# Patient Record
Sex: FEMALE | Race: WHITE | NOT HISPANIC OR LATINO | Employment: PART TIME | URBAN - METROPOLITAN AREA
[De-identification: names, ages, dates, MRNs, and addresses within clinical notes are randomized per-mention and may not be internally consistent; named-entity substitution may affect disease eponyms.]

---
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
## 2022-03-01 ENCOUNTER — OFFICE VISIT (OUTPATIENT)
Dept: FAMILY MEDICINE CLINIC | Facility: CLINIC | Age: 22
End: 2022-03-01

## 2022-03-01 VITALS
BODY MASS INDEX: 29.25 KG/M2 | WEIGHT: 149 LBS | RESPIRATION RATE: 14 BRPM | SYSTOLIC BLOOD PRESSURE: 108 MMHG | HEIGHT: 60 IN | HEART RATE: 106 BPM | DIASTOLIC BLOOD PRESSURE: 88 MMHG | OXYGEN SATURATION: 98 %

## 2022-03-01 DIAGNOSIS — R21 RASH AND NONSPECIFIC SKIN ERUPTION: Primary | ICD-10-CM

## 2022-03-01 DIAGNOSIS — L59.0 ERYTHEMA AB IGNE: ICD-10-CM

## 2022-03-01 PROCEDURE — 99213 OFFICE O/P EST LOW 20 MIN: CPT | Performed by: NURSE PRACTITIONER

## 2022-03-01 NOTE — PROGRESS NOTES
Subjective   Elvia Fernández is a 22 y.o. female.   abnormal mapping on her lower back    History of Present Illness   Rash that started on her back a couple of months ago.She has not tried any new shampoos or creams or any new clothing. It is not painful or pruritic.    The following portions of the patient's history were reviewed and updated as appropriate: allergies, current medications, past family history, past medical history, past social history, past surgical history and problem list.    Review of Systems   Constitutional: Negative for activity change, appetite change and fever.   HENT: Negative for congestion.    Respiratory: Negative for cough.    Skin: Positive for rash.       Objective   Physical Exam  Vitals and nursing note reviewed.   Constitutional:       Appearance: She is well-developed.   HENT:      Head: Normocephalic and atraumatic.   Eyes:      Pupils: Pupils are equal, round, and reactive to light.   Pulmonary:      Effort: Pulmonary effort is normal.   Musculoskeletal:         General: Normal range of motion.   Skin:     General: Skin is warm and dry.      Findings: Rash present.      Comments: Erythematous reticulated hyperpigmentation to back.   Neurological:      Mental Status: She is alert and oriented to person, place, and time.           Assessment/Plan   Problem List Items Addressed This Visit     None      Visit Diagnoses     Rash and nonspecific skin eruption    -  Primary    Relevant Orders    Ambulatory Referral to Dermatology    Erythema ab igne            Stop the use of the heating pad       Return if symptoms worsen or fail to improve.

## 2022-05-05 ENCOUNTER — TELEPHONE (OUTPATIENT)
Dept: URGENT CARE | Facility: CLINIC | Age: 22
End: 2022-05-05

## 2022-05-05 NOTE — TELEPHONE ENCOUNTER
Spoke to patient about her positive Covid results.  Patient stated she went ahead and quarantined but just wanted to make sure.  Apologized for getting test results back late and patient understood.

## 2024-11-05 ENCOUNTER — OFFICE VISIT (OUTPATIENT)
Dept: INTERNAL MEDICINE | Facility: CLINIC | Age: 24
End: 2024-11-05
Payer: COMMERCIAL

## 2024-11-05 VITALS
HEART RATE: 94 BPM | OXYGEN SATURATION: 99 % | TEMPERATURE: 97.1 F | DIASTOLIC BLOOD PRESSURE: 82 MMHG | SYSTOLIC BLOOD PRESSURE: 116 MMHG | WEIGHT: 149 LBS | BODY MASS INDEX: 29.1 KG/M2

## 2024-11-05 DIAGNOSIS — M79.641 RIGHT HAND PAIN: ICD-10-CM

## 2024-11-05 DIAGNOSIS — Z76.89 ENCOUNTER TO ESTABLISH CARE: Primary | ICD-10-CM

## 2024-11-05 DIAGNOSIS — Z09 HOSPITAL DISCHARGE FOLLOW-UP: ICD-10-CM

## 2024-11-05 DIAGNOSIS — M54.2 CERVICAL PAIN (NECK): ICD-10-CM

## 2024-11-05 DIAGNOSIS — S13.100D: ICD-10-CM

## 2024-11-05 DIAGNOSIS — M54.6 ACUTE BILATERAL THORACIC BACK PAIN: ICD-10-CM

## 2024-11-05 PROCEDURE — 99215 OFFICE O/P EST HI 40 MIN: CPT

## 2024-11-05 PROCEDURE — 99417 PROLNG OP E/M EACH 15 MIN: CPT

## 2024-11-05 RX ORDER — IBUPROFEN 600 MG/1
TABLET ORAL
COMMUNITY
Start: 2024-10-29

## 2024-11-05 RX ORDER — METHOCARBAMOL 750 MG/1
1500 TABLET, FILM COATED ORAL 3 TIMES DAILY
COMMUNITY
Start: 2024-10-29 | End: 2024-11-12

## 2024-11-05 RX ORDER — ERYTHROMYCIN 5 MG/G
OINTMENT OPHTHALMIC
COMMUNITY
Start: 2024-10-29

## 2024-11-05 RX ORDER — MULTIPLE VITAMINS W/ MINERALS TAB 9MG-400MCG
1 TAB ORAL DAILY
COMMUNITY

## 2024-11-05 NOTE — PATIENT INSTRUCTIONS
Cervical Subluxation    Cervical subluxation is a separation of the neck bones (cervical vertebrae). It occurs due to an injury of the ligaments that hold the vertebrae together. The cervical vertebrae are made up of seven bones. These vertebrae support the head and protect the spinal cord as it passes through the neck.  What are the causes?  This condition may be caused by injury or trauma that happens with sudden impact. It commonly occurs when the neck is extended too far. This can happen from:  A car accident. This is the most common cause of this condition.  A sports injury.  A fall.  A violent physical attack.  Other causes include:  Having arthritis.  Having bad posture over a long period of time (chronic).  What are the signs or symptoms?  Symptoms can vary depending on the angle of separation to the cervical vertebrae. Symptoms include:  Pain or tenderness when the neck is touched.  Pain when moving the neck or trouble moving the neck.  Headaches.  A stiff neck.  Neck muscle spasms.  Dizziness.  A change in the position of the neck.  With a spinal cord or nerve root injury in the neck, symptoms may also include:  Weakness and numbness in the arm or leg.  A weak hand grasp. This may cause you to drop things.  Poor handwriting.  Falling or having trouble balancing.  Loss of bowel or bladder control (incontinence).  How is this diagnosed?  This condition is diagnosed with your medical history, a physical exam, and imaging tests. Tests may include:  X-rays.  A CT scan.  An MRI.  How is this treated?  Treatment for this condition depends on the severity of the condition. Treatment may involve:  Resting.  Wearing a neck brace or collar to support the neck. This limits how much you can move your neck.  Medicine to reduce pain and swelling.  Closed reduction. This uses physical manipulation to realign the cervical vertebrae without surgery.  Skeletal traction. This uses weights, pulleys, and ropes to exert a  pulling force to help realign the cervical vertebrae. It may be used prior to surgery.  Surgery to put the cervical vertebrae back in place. Surgery is needed if:  The cervical vertebrae do not stay in place and they move easily (are unstable).  There is a lot of nerve damage in the cervical vertebrae area.  There is a spinal cord injury.  There is a pocket of blood over the spinal cord.  There is a cervical disc pushing on the spinal cord.  Follow these instructions at home:  If you have a neck brace or collar:  Wear the neck brace or collar as told by your health care provider. Remove it only as told by your health care provider.  Check the skin around the brace or collar every day. Tell your health care provider about any concerns.  Keep the neck brace or collar clean and dry.  Ask your health care provider when it is safe to drive if you have a brace or collar on your neck.  If the brace or collar is not waterproof:  Do not let it get wet.  Cover it with a watertight covering when you take a bath or shower.  Activity    Rest and limit your neck movement as told by your health care provider.  Limit your physical activity as told by your health care provider.  You may have to avoid lifting. Ask your health care provider how much you can safely lift.  If physical therapy was prescribed, do any exercises as told by your health care provider or physical therapist.  General instructions  Take over-the-counter and prescription medicines only as told by your health care provider.  Ask your health care provider if the medicine prescribed to you:  Requires you to avoid driving or using machinery.  Can cause constipation. You may need to take these actions to prevent or treat constipation:  Drink enough fluid to keep your urine pale yellow.  Take over-the-counter or prescription medicines.  Eat foods that are high in fiber, such as beans, whole grains, and fresh fruits and vegetables.  Limit foods that are high in fat and  processed sugars, such as fried or sweet foods.  Do not take baths, swim, or use a hot tub until your health care provider approves. Ask your health care provider if you may take showers. You may only be allowed to take sponge baths.  Keep all follow-up visits. This is important to get follow-up tests and to see how you are healing. Your health care provider will also determine when your brace can be removed.  Contact a health care provider if:  Your pain continues and does not get better after you take pain medicine.  You have weakness, numbness, or tingling in your arms or legs that is getting worse.  You have a fever.  Get help right away if:  You have sudden, severe neck pain.  You have sudden, severe weakness, numbness, or tingling in your arms or legs.  You have difficulty walking, or you fall for no reason.  You have a severe headache that does not go away.  You cannot control your bowels or bladder.  You have difficulty breathing.  These symptoms may be an emergency. Get help right away. Call 911.  Do not wait to see if the symptoms will go away.  Do not drive yourself to the hospital.  Summary  Cervical subluxation is a separation of the neck bones (cervical vertebrae). It occurs due to an injury of the ligaments that hold the vertebrae together.  Treatment for this condition depends on the severity of the condition.  There are many treatment options, such as resting, wearing a neck brace or collar, taking medicine for pain, and having physical manipulation to realign the cervical vertebrae.  If you have a neck brace or collar, wear it as told by your health care provider.  Get help right away if you have sudden, severe neck pain, or you have sudden, severe weakness, numbness, or tingling in your arms or legs.  This information is not intended to replace advice given to you by your health care provider. Make sure you discuss any questions you have with your health care provider.  Document Revised:  03/14/2023 Document Reviewed: 03/14/2023  [employer] Patient Education © 2024 [employer]     Motor Vehicle Collision Injury, Adult  After a car accident (motor vehicle collision), it is common to have injuries to your head, face, arms, and body. These injuries may include cuts, burns, and bruises. The injuries may also include sore muscles, muscles strains, headaches, and broken bones.  You may feel stiff and sore for the first several hours. You may feel worse after waking up the first morning after the accident. These injuries often feel worse for the first 24-48 hours. After that, you will usually begin to get better with each day. How quickly you get better often depends on:  How bad the accident was.  How many injuries you have.  Where your injuries are.  What types of injuries you have.  If you were wearing a seat belt.  If your airbag was used.  A head injury may result in a concussion. This is a type of brain injury that can have serious effects. If you have a concussion, you should rest as told by your doctor. You must be very careful to avoid having a second concussion.  Follow these instructions at home:  Medicines  Take over-the-counter and prescription medicines only as told by your doctor.  If you were prescribed antibiotics, take or apply them as told by your doctor. Do not stop using them even if you start to feel better.  Wound care    Follow instructions from your doctor about how to take care of your wound. Make sure you:  Clean your wound. To do this:  Wash it with mild soap and water.  Rinse it with water to get all the soap off.  Pat it dry with a clean towel. Do not rub it.  Put an ointment or cream on the wound, if you were told to do so.  Know when and how to change or remove your bandage (dressing).  Always wash your hands with soap and water for at least 20 seconds before and after you change your bandage. If you cannot use soap and water, use hand .  Leave stitches or skin glue  in place for at least 2 weeks.  Leave tape strips alone unless you are told to take them off. You may trim the edges of the tape strips if they curl up.  Avoid getting sun on your wound.  Do not disturb the wound. This means:  Do not scratch or pick at the wound.  Do not break any blisters you may have.  Do not peel any skin.  Check your wound every day for signs of infection. Check for:  More redness, swelling, or pain.  More fluid or blood.  Warmth.  Pus or a bad smell.     Managing pain, stiffness, and swelling    If told, put ice on the injured areas.  Put ice in a plastic bag.  Place a towel between your skin and the bag.  Leave the ice on for 20 minutes, 2-3 times a day.  If your skin turns bright red, take off the ice right away to prevent skin damage. The risk of skin damage is higher if you cannot feel pain, heat, or cold.  Raise (elevate) the wound above the level of your heart while you are sitting or lying down.  Sleep with your head raised if the wound is on your face. You may do this by putting an extra pillow under your head.  Activity  Rest. Rest helps your body to heal. Make sure you:  Get plenty of sleep at night. Avoid staying up late.  Go to bed at the same time on weekends and weekdays.  You may have to avoid lifting. Ask your doctor how much you can safely lift.  Ask your doctor when you can drive, ride a bicycle, or use machinery. Do not do these activities if you are dizzy.  If you are told to wear a brace on an injured arm, leg, or other part of your body, follow instructions from your doctor about activities. Your doctor may give you instructions about driving, bathing, exercising, or working.  General instructions  If you have a splint, brace, or sling, follow your doctor's instructions on how to use the device.  Drink enough fluid to keep your pee (urine) pale yellow.  Do not drink alcohol.  Eat healthy foods.  Contact a doctor if:  You have very bad neck pain, especially pain in the  middle of the back of your neck.  You have loss of feeling (numbness), tingling, or weakness in your arms or legs.  You have a change in your ability to control your pee or poop (stool).  You have swelling in any area of your body, especially your legs.  You have signs of infection in a wound.  You have a fever.  You have blood in your pee, poop, or vomit.  You have any of the following symptoms for more than 2 weeks after your car accident:  Long-term (chronic) headaches.  Dizziness or balance problems.  Feeling like you may vomit.  Problems with how you see (vision).  More sensitivity to noise or light.  Sleep problems.  Feeling tired all the time.  Mental health changes such as:  Depression or mood swings.  Feeling worried or nervous (anxiety).  Getting upset or bothered easily.  Memory problems.  Trouble concentrating or paying attention.  Get help right away if:  You have shortness of breath.  You have light-headedness or you faint.  You have chest pain.  You have these eye or vision changes:  Sudden vision loss or double vision.  Your eye suddenly turns red.  The black center of your eye (pupil) is an odd shape or size.  These symptoms may be an emergency. Get help right away. Call 911.  Do not wait to see if the symptoms will go away.  Do not drive yourself to the hospital.  This information is not intended to replace advice given to you by your health care provider. Make sure you discuss any questions you have with your health care provider.  Document Revised: 06/12/2023 Document Reviewed: 06/12/2023  [employer] Patient Education © 2024 [employer]

## 2024-11-05 NOTE — LETTER
November 5, 2024     Patient: Elvia Fernández   YOB: 2000   Date of Visit: 11/5/2024       To Whom It May Concern:    It is my medical opinion that Elvia Fernández may return to work 11/10/24         Sincerely,        WYATT Koo    CC: No Recipients
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
## 2024-11-07 ENCOUNTER — PATIENT ROUNDING (BHMG ONLY) (OUTPATIENT)
Dept: INTERNAL MEDICINE | Facility: CLINIC | Age: 24
End: 2024-11-07
Payer: COMMERCIAL

## 2024-12-02 ENCOUNTER — TELEPHONE (OUTPATIENT)
Dept: ORTHOPEDICS | Facility: OTHER | Age: 24
End: 2024-12-02
Payer: COMMERCIAL

## 2024-12-02 NOTE — TELEPHONE ENCOUNTER
PATIENT IS SCHEDULED FOR A MRI ON 12/12 AND WANTED TO KNOW IF WE STILL WANT HER TO COME IN TOMORROW BEFORE THE MRI.  PLEASE CALL HER BACK.

## 2024-12-05 ENCOUNTER — PATIENT ROUNDING (BHMG ONLY) (OUTPATIENT)
Dept: URGENT CARE | Facility: CLINIC | Age: 24
End: 2024-12-05
Payer: COMMERCIAL

## 2024-12-05 NOTE — ED NOTES
Thank you for letting us care for you during your recent visit at [facility]. We would love to hear about your experience with us.         We’re always looking for ways to make our patients’ experiences even better. Do you have any recommendations on ways we may improve?         I appreciate you taking the time to respond. Please be on the lookout for a survey about your recent visit from [facility] via text or email. We would greatly appreciate if you could fill that out and turn it back in. We want your voice to be heard and we value your feedback.         Thank you for choosing [facility] for your healthcare needs.

## 2024-12-12 ENCOUNTER — HOSPITAL ENCOUNTER (OUTPATIENT)
Dept: MRI IMAGING | Facility: HOSPITAL | Age: 24
Discharge: HOME | End: 2024-12-12
Payer: COMMERCIAL

## 2024-12-12 DIAGNOSIS — M54.2 CERVICAL PAIN (NECK): ICD-10-CM

## 2024-12-12 DIAGNOSIS — M79.641 RIGHT HAND PAIN: ICD-10-CM

## 2024-12-12 DIAGNOSIS — S13.100D: ICD-10-CM

## 2024-12-12 DIAGNOSIS — M54.6 ACUTE BILATERAL THORACIC BACK PAIN: ICD-10-CM

## 2024-12-12 PROCEDURE — 72141 MRI NECK SPINE W/O DYE: CPT

## 2024-12-12 PROCEDURE — 73218 MRI UPPER EXTREMITY W/O DYE: CPT

## 2024-12-12 PROCEDURE — 72146 MRI CHEST SPINE W/O DYE: CPT

## 2024-12-13 DIAGNOSIS — S12.690A COMPRESSION FRACTURE OF C7 VERTEBRA, INITIAL ENCOUNTER: Primary | ICD-10-CM

## 2024-12-13 DIAGNOSIS — S22.030A COMPRESSION FRACTURE OF T3 VERTEBRA, INITIAL ENCOUNTER: ICD-10-CM

## 2024-12-13 DIAGNOSIS — M50.30 BULGING OF CERVICAL INTERVERTEBRAL DISC: ICD-10-CM

## 2024-12-13 DIAGNOSIS — S22.010A COMPRESSION FRACTURE OF T1 VERTEBRA, INITIAL ENCOUNTER: ICD-10-CM

## 2024-12-13 DIAGNOSIS — S22.020A COMPRESSION FRACTURE OF T2 VERTEBRA, INITIAL ENCOUNTER: ICD-10-CM
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
## 2024-12-16 ENCOUNTER — TREATMENT (OUTPATIENT)
Age: 24
End: 2024-12-16
Payer: COMMERCIAL

## 2024-12-16 DIAGNOSIS — M54.2 PAIN, NECK: Primary | ICD-10-CM

## 2024-12-16 DIAGNOSIS — M54.9 MID BACK PAIN: ICD-10-CM

## 2024-12-16 PROCEDURE — 97161 PT EVAL LOW COMPLEX 20 MIN: CPT | Performed by: PHYSICAL THERAPIST

## 2024-12-16 PROCEDURE — 97140 MANUAL THERAPY 1/> REGIONS: CPT | Performed by: PHYSICAL THERAPIST

## 2024-12-16 PROCEDURE — 97110 THERAPEUTIC EXERCISES: CPT | Performed by: PHYSICAL THERAPIST
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
## 2024-12-30 ENCOUNTER — OFFICE VISIT (OUTPATIENT)
Dept: INTERNAL MEDICINE | Facility: CLINIC | Age: 24
End: 2024-12-30
Payer: COMMERCIAL

## 2024-12-30 VITALS
TEMPERATURE: 97.1 F | WEIGHT: 161 LBS | HEART RATE: 107 BPM | DIASTOLIC BLOOD PRESSURE: 84 MMHG | SYSTOLIC BLOOD PRESSURE: 116 MMHG | OXYGEN SATURATION: 98 % | BODY MASS INDEX: 31.61 KG/M2 | HEIGHT: 60 IN

## 2024-12-30 DIAGNOSIS — M62.830 MUSCLE SPASM OF BACK: Chronic | ICD-10-CM

## 2024-12-30 DIAGNOSIS — M54.50 LUMBAR PAIN: Chronic | ICD-10-CM

## 2024-12-30 DIAGNOSIS — M54.2 CERVICAL PAIN: Primary | Chronic | ICD-10-CM

## 2024-12-30 PROCEDURE — 99214 OFFICE O/P EST MOD 30 MIN: CPT

## 2024-12-30 RX ORDER — METHOCARBAMOL 500 MG/1
500 TABLET, FILM COATED ORAL 2 TIMES DAILY PRN
Qty: 60 TABLET | Refills: 0 | Status: SHIPPED | OUTPATIENT
Start: 2024-12-30 | End: 2025-01-08

## 2024-12-30 NOTE — LETTER
January 6, 2025     Patient: Elvia Fernández   YOB: 2000   Date of Visit: 12/30/2024       To Whom It May Concern:    It is my medical opinion that Elvia Fernández may return to work today.         Sincerely,        WYATT Koo    CC: No Recipients
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
## 2024-12-30 NOTE — PATIENT INSTRUCTIONS
Cervical Strain and Sprain Rehab  Ask your health care provider which exercises are safe for you. Do exercises exactly as told by your health care provider and adjust them as directed. It is normal to feel mild stretching, pulling, tightness, or discomfort as you do these exercises. Stop right away if you feel sudden pain or your pain gets worse. Do not begin these exercises until told by your health care provider.  Stretching and range-of-motion exercises  Cervical side bending    Using good posture, sit on a stable chair or stand up.  Without moving your shoulders, slowly tilt your left / right ear to your shoulder until you feel a stretch in the neck muscles on the opposite side. You should be looking straight ahead.  Hold for __________ seconds.  Repeat with the other side of your neck.  Repeat __________ times. Complete this exercise __________ times a day.  Cervical rotation    Using good posture, sit on a stable chair or stand up.  Slowly turn your head to the side as if you are looking over your left / right shoulder.  Keep your eyes level with the ground.  Stop when you feel a stretch along the side and the back of your neck.  Hold for __________ seconds.  Repeat this by turning to your other side.  Repeat __________ times. Complete this exercise __________ times a day.  Thoracic extension and pectoral stretch    Roll a towel or a small blanket so it is about 4 inches (10 cm) in diameter.  Lie down on your back on a firm surface.  Put the towel in the middle of your back across your spine. It should not be under your shoulder blades.  Put your hands behind your head and let your elbows fall out to your sides.  Hold for __________ seconds.  Repeat __________ times. Complete this exercise __________ times a day.  Strengthening exercises  Upper cervical flexion    Lie on your back with a thin pillow behind your head or a small, rolled-up towel under your neck.  Gently tuck your chin toward your chest and nod  your head down to look toward your feet. Do not lift your head off the pillow.  Hold for __________ seconds.  Release the tension slowly. Relax your neck muscles completely before you repeat this exercise.  Repeat __________ times. Complete this exercise __________ times a day.  Cervical extension    Stand about 6 inches (15 cm) away from a wall, with your back facing the wall.  Place a soft object, about 6-8 inches (15-20 cm) in diameter, between the back of your head and the wall. A soft object could be a small pillow, a ball, or a folded towel.  Gently tilt your head back and press into the soft object. Keep your jaw and forehead relaxed.  Hold for __________ seconds.  Release the tension slowly. Relax your neck muscles completely before you repeat this exercise.  Repeat __________ times. Complete this exercise __________ times a day.  Posture and body mechanics  Body mechanics refer to the movements and positions of your body while you do your daily activities. Posture is part of body mechanics. Good posture and healthy body mechanics can help to relieve stress in your body's tissues and joints. Good posture means that your spine is in its natural S-curve position (your spine is neutral), your shoulders are pulled back slightly, and your head is not tipped forward.  The following are general guidelines for using improved posture and body mechanics in your everyday activities.  Sitting    When sitting, keep your spine neutral and keep your feet flat on the floor. Use a footrest, if needed, and keep your thighs parallel to the floor. Avoid rounding your shoulders. Avoid tilting your head forward.  When working at a desk or a computer, keep your desk at a height where your hands are slightly lower than your elbows. Slide your chair under your desk so you are close enough to maintain good posture.  When working at a computer, place your monitor at a height where you are looking straight ahead and you do not have to  tilt your head forward or downward to look at the screen.  Standing    When standing, keep your spine neutral and keep your feet about hip-width apart. Keep a slight bend in your knees. Your ears, shoulders, and hips should line up.  When you do a task in which you  one place for a long time, place one foot up on a stable object that is 2-4 inches (5-10 cm) high, such as a footstool. This helps keep your spine neutral.  Resting    When lying down and resting, avoid positions that are most painful for you. Try to support your neck in a neutral position. You can use a contour pillow or a small rolled-up towel. Your pillow should support your neck but not push on it.  This information is not intended to replace advice given to you by your health care provider. Make sure you discuss any questions you have with your health care provider.  Document Revised: 07/10/2023 Document Reviewed: 07/10/2023  [employer] Patient Education © 2024 [employer]        Cervical Strain and Sprain Rehab  Ask your health care provider which exercises are safe for you. Do exercises exactly as told by your health care provider and adjust them as directed. It is normal to feel mild stretching, pulling, tightness, or discomfort as you do these exercises. Stop right away if you feel sudden pain or your pain gets worse. Do not begin these exercises until told by your health care provider.  Stretching and range-of-motion exercises  Cervical side bending    Using good posture, sit on a stable chair or stand up.  Without moving your shoulders, slowly tilt your left / right ear to your shoulder until you feel a stretch in the neck muscles on the opposite side. You should be looking straight ahead.  Hold for __________ seconds.  Repeat with the other side of your neck.  Repeat __________ times. Complete this exercise __________ times a day.  Cervical rotation    Using good posture, sit on a stable chair or stand up.  Slowly turn your head to  the side as if you are looking over your left / right shoulder.  Keep your eyes level with the ground.  Stop when you feel a stretch along the side and the back of your neck.  Hold for __________ seconds.  Repeat this by turning to your other side.  Repeat __________ times. Complete this exercise __________ times a day.  Thoracic extension and pectoral stretch    Roll a towel or a small blanket so it is about 4 inches (10 cm) in diameter.  Lie down on your back on a firm surface.  Put the towel in the middle of your back across your spine. It should not be under your shoulder blades.  Put your hands behind your head and let your elbows fall out to your sides.  Hold for __________ seconds.  Repeat __________ times. Complete this exercise __________ times a day.  Strengthening exercises  Upper cervical flexion    Lie on your back with a thin pillow behind your head or a small, rolled-up towel under your neck.  Gently tuck your chin toward your chest and nod your head down to look toward your feet. Do not lift your head off the pillow.  Hold for __________ seconds.  Release the tension slowly. Relax your neck muscles completely before you repeat this exercise.  Repeat __________ times. Complete this exercise __________ times a day.  Cervical extension    Stand about 6 inches (15 cm) away from a wall, with your back facing the wall.  Place a soft object, about 6-8 inches (15-20 cm) in diameter, between the back of your head and the wall. A soft object could be a small pillow, a ball, or a folded towel.  Gently tilt your head back and press into the soft object. Keep your jaw and forehead relaxed.  Hold for __________ seconds.  Release the tension slowly. Relax your neck muscles completely before you repeat this exercise.  Repeat __________ times. Complete this exercise __________ times a day.  Posture and body mechanics  Body mechanics refer to the movements and positions of your body while you do your daily activities.  Posture is part of body mechanics. Good posture and healthy body mechanics can help to relieve stress in your body's tissues and joints. Good posture means that your spine is in its natural S-curve position (your spine is neutral), your shoulders are pulled back slightly, and your head is not tipped forward.  The following are general guidelines for using improved posture and body mechanics in your everyday activities.  Sitting    When sitting, keep your spine neutral and keep your feet flat on the floor. Use a footrest, if needed, and keep your thighs parallel to the floor. Avoid rounding your shoulders. Avoid tilting your head forward.  When working at a desk or a computer, keep your desk at a height where your hands are slightly lower than your elbows. Slide your chair under your desk so you are close enough to maintain good posture.  When working at a computer, place your monitor at a height where you are looking straight ahead and you do not have to tilt your head forward or downward to look at the screen.  Standing    When standing, keep your spine neutral and keep your feet about hip-width apart. Keep a slight bend in your knees. Your ears, shoulders, and hips should line up.  When you do a task in which you  one place for a long time, place one foot up on a stable object that is 2-4 inches (5-10 cm) high, such as a footstool. This helps keep your spine neutral.  Resting    When lying down and resting, avoid positions that are most painful for you. Try to support your neck in a neutral position. You can use a contour pillow or a small rolled-up towel. Your pillow should support your neck but not push on it.  This information is not intended to replace advice given to you by your health care provider. Make sure you discuss any questions you have with your health care provider.  Document Revised: 07/10/2023 Document Reviewed: 07/10/2023  [employer] Patient Education © 2024 [employer]

## 2025-01-08 ENCOUNTER — PATIENT MESSAGE (OUTPATIENT)
Dept: INTERNAL MEDICINE | Facility: CLINIC | Age: 25
End: 2025-01-08
Payer: COMMERCIAL

## 2025-01-08 PROBLEM — M54.50 LUMBAR PAIN: Chronic | Status: ACTIVE | Noted: 2025-01-08

## 2025-01-08 PROBLEM — M54.2 CERVICAL PAIN: Chronic | Status: ACTIVE | Noted: 2025-01-08

## 2025-01-08 RX ORDER — METHOCARBAMOL 500 MG/1
500 TABLET, FILM COATED ORAL 2 TIMES DAILY PRN
Qty: 60 TABLET | Refills: 0 | Status: SHIPPED | OUTPATIENT
Start: 2025-01-08

## 2025-01-08 NOTE — TELEPHONE ENCOUNTER
Called pt [phone] and gave contact number for spinal office - pt also asked for phone number for [facility] who she is sched with for 1/13 number provided.

## 2025-01-17 ENCOUNTER — OFFICE VISIT (OUTPATIENT)
Dept: ORTHOPEDIC SURGERY | Facility: CLINIC | Age: 25
End: 2025-01-17
Payer: COMMERCIAL

## 2025-01-17 VITALS — WEIGHT: 156.6 LBS | TEMPERATURE: 98.2 F | BODY MASS INDEX: 30.74 KG/M2 | HEIGHT: 60 IN

## 2025-01-17 DIAGNOSIS — S13.4XXA WHIPLASH INJURIES, INITIAL ENCOUNTER: ICD-10-CM

## 2025-01-17 DIAGNOSIS — M54.2 CERVICAL PAIN: Primary | Chronic | ICD-10-CM

## 2025-01-17 PROCEDURE — 99214 OFFICE O/P EST MOD 30 MIN: CPT | Performed by: NURSE PRACTITIONER
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
## 2025-01-19 PROBLEM — M77.8 TENDINITIS OF FINGER OF RIGHT HAND: Status: ACTIVE | Noted: 2025-01-13

## 2025-01-19 PROBLEM — Z00.00 ANNUAL PHYSICAL EXAM: Status: ACTIVE | Noted: 2025-01-19

## 2025-01-19 PROBLEM — M79.644 PAIN IN FINGER OF RIGHT HAND: Status: ACTIVE | Noted: 2025-01-13

## 2025-01-20 ENCOUNTER — OFFICE VISIT (OUTPATIENT)
Dept: INTERNAL MEDICINE | Facility: CLINIC | Age: 25
End: 2025-01-20
Payer: COMMERCIAL

## 2025-01-20 VITALS
HEIGHT: 60 IN | HEART RATE: 91 BPM | SYSTOLIC BLOOD PRESSURE: 102 MMHG | DIASTOLIC BLOOD PRESSURE: 72 MMHG | OXYGEN SATURATION: 100 % | BODY MASS INDEX: 30.82 KG/M2 | WEIGHT: 157 LBS | TEMPERATURE: 96.9 F

## 2025-01-20 DIAGNOSIS — Z11.59 NEED FOR HEPATITIS C SCREENING TEST: ICD-10-CM

## 2025-01-20 DIAGNOSIS — Z13.6 SCREENING FOR CARDIOVASCULAR CONDITION: ICD-10-CM

## 2025-01-20 DIAGNOSIS — M54.2 CERVICAL PAIN: Chronic | ICD-10-CM

## 2025-01-20 DIAGNOSIS — Z23 NEED FOR VACCINATION: ICD-10-CM

## 2025-01-20 DIAGNOSIS — Z00.00 ROUTINE HEALTH MAINTENANCE: ICD-10-CM

## 2025-01-20 DIAGNOSIS — Z00.00 ANNUAL PHYSICAL EXAM: Primary | ICD-10-CM

## 2025-01-20 DIAGNOSIS — E66.811 CLASS 1 OBESITY DUE TO EXCESS CALORIES WITHOUT SERIOUS COMORBIDITY WITH BODY MASS INDEX (BMI) OF 30.0 TO 30.9 IN ADULT: Chronic | ICD-10-CM

## 2025-01-20 DIAGNOSIS — M54.50 LUMBAR PAIN: Chronic | ICD-10-CM

## 2025-01-20 DIAGNOSIS — E66.09 CLASS 1 OBESITY DUE TO EXCESS CALORIES WITHOUT SERIOUS COMORBIDITY WITH BODY MASS INDEX (BMI) OF 30.0 TO 30.9 IN ADULT: Chronic | ICD-10-CM

## 2025-01-20 PROCEDURE — 99395 PREV VISIT EST AGE 18-39: CPT

## 2025-01-20 PROCEDURE — 99214 OFFICE O/P EST MOD 30 MIN: CPT

## 2025-01-20 PROCEDURE — 91320 SARSCV2 VAC 30MCG TRS-SUC IM: CPT

## 2025-01-20 PROCEDURE — 90480 ADMN SARSCOV2 VAC 1/ONLY CMP: CPT

## 2025-01-20 NOTE — ASSESSMENT & PLAN NOTE
Patient's (Body mass index is 30.66 kg/m².) indicates that they are obese (BMI >30) with health conditions that include none . Weight is newly identified. BMI  is above average; BMI management plan is completed. We discussed low calorie, low carb based diet program, portion control, increasing exercise, management of depression/anxiety/stress to control compensatory eating, and Information on healthy weight added to patient's after visit summary.     Per CDC recommendations each week the average adult needs 150 minutes (30 min workouts 3-5 times a week) of moderate - intensity physical activity and 2 days of muscle strengthening.

## 2025-01-20 NOTE — ASSESSMENT & PLAN NOTE
Please continue with PT for the next 12 weeks.  If you need refills please request from our office if muscle spasms continue.

## 2025-01-20 NOTE — ASSESSMENT & PLAN NOTE
Current recommendations according to the current Physical Activity Guidelines for Americans: adults need 150-300 minutes of physical exercise weekly. It is also recommended to perform two sessions of full body strength training exercise weekly which includes all major muscle groups including legs, hips, back, abdomen, chest, shoulders, and arms.   Current CDC recommendations for diet include following a diet that emphasizes fruits, vegetables, whole grains that is low in saturated fats and low in sugar intake.   Adults should consume at least 3 cup equivalents of fruit and vegetables daily. It is also beneficial to get 25 grams of fiber daily unless told otherwise by your healthcare provider    Anticipatory guidance given regarding health prevention/wellness, diet/exercise, tobacco/alcohol/drug education, exercise and wellbeing, covid 19 guidance, vaccination recommendations, and sexual health/STD education.   Recommended bi-yearly dental exams and regular vision examinations.

## 2025-01-20 NOTE — PROGRESS NOTES
Chief Complaint  Annual Exam     Subjective:      History of Present Illness {CC  Problem List  Visit  Diagnosis   Encounters  Notes  Medications  Labs  Result Review Imaging  Media :23}     Elvia Fernández presents to [facility] for:    Patient or patient representative verbalized consent for the use of Ambient Listening during the visit with  WYATT Koo for chart documentation. 1/20/2025  08:26 EST     History of Present Illness      The patient presents for a physical exam.    Following visit with Santa Moreno with [facility] She is scheduled for an additional 12 weeks of physical therapy, with arrangements to be made this week. She has consulted with a hand surgeon who determined that surgical intervention is not necessary at this time. Instead, she will undergo 6 more weeks of physical therapy. If this approach proves ineffective, a cortisone injection will be considered, followed by surgery as a last resort. The surgeon anticipates that her condition should improve with these measures. She has recently resumed work without any issues. She reports no lumps or bumps that require examination today. She does not require any medication refills at this time and has approximately half of her Robaxin supply remaining. She inquires if she can request a refill of Robaxin if she needs it after completing the current supply.    Her last Pap smear was conducted on 07/05/2024, with all previous results being normal. She declines STD screening today, as she typically undergoes these tests with her gynecologist, Dr. Melendrez. She is sexually active and has never had a mammogram. She reports no changes in bowel habits over the past year, such as chronic constipation or diarrhea. She underwent a colonoscopy at the age of 4 due to rectal bleeding but has not required one since. She maintains good oral health, visiting the dentist twice a year, with her last visit being 3  months ago. She does not regularly have her eyes checked but uses reading glasses. She walks approximately 3 miles daily at work but does not engage in other physical activities. She is single and works full-time at [employer]. She experiences low stress levels but high anxiety.    SOCIAL HISTORY  She is single and works full-time at [employer]. She reports no history of smoking.    FAMILY HISTORY  Her maternal grandmother had diabetes and schizophrenia. Her maternal side also has a history of breast cancer, brain cancer, and liver cancer. Her paternal side has a history of throat cancer.    MEDICATIONS  Bhargavi Gan is here for coordination of medical care, to discuss health maintenance, disease prevention as well as to followup on medical problems.     History reviewed. No pertinent past medical history.  Family History   Problem Relation Age of Onset    Schizophrenia Maternal Grandmother     Diabetes Maternal Grandfather     Cancer Other         several members    Drug abuse Other         several members     Social History     Tobacco Use   Smoking Status Never   Smokeless Tobacco Never     Social History     Substance and Sexual Activity   Alcohol Use Yes    Comment: rarely       Patient Care Team:  Cynthia Silva APRN as PCP - General (Family Medicine)     Social:  Marital status: single. She feels safe at home  Employment: full time. [employer]  Patient rates her stress level as: low. Anxiety is high    Dental visits: 2 times a year.last visit three months ago   Dental health: good. Brushes teeth 2 times a day, flosses occasionally.   Vision correction: not needed. Wears reading glasses  Hearing: normal.  Activity level is minimal.   Exercises 3 per week. Walking three miles a day at work     Weight trend is     Health and Weight:   Weight trend is   Wt Readings from Last 4 Encounters:   01/20/25 71.2 kg (157 lb)   01/17/25 71 kg (156 lb 9.6 oz)   12/30/24 73 kg (161 lb)   12/02/24 70.3 kg (155 lb)         Mental Health  Check:   Depression screen: PHQ-2 Total Score:  0    Health Maintenance Female:  GYN: Dr Melendrez  Last gynecology appointment: 07/05/25  Patient's last mammogram was never  Last Completed Mammogram       This patient has no relevant Health Maintenance data.           Advised routine self-breast exams monthly.  Sexually active: yes  Pap smears have been: Benign   Dexa scan (65 for women and 70 for men with fractures)- N/a  STI Screen:   [] HIV     [] Syphilis   [] Chlamydia/ Gonorrhea   [x] Declines STD screen    Colon cancer screen:     She has no change in bowel habits.   Patient's last colonoscopy was @ 4 years of age.   Last Completed Colonoscopy       This patient has no relevant Health Maintenance data.           She was advised to repeat in Patient declines due to age.    Vaccines:   Vaccines Due:   [] Prevnar 20     [] Flu  [] Shingrix (shingles: series of 2)   [] TDap  [x] COVID (booster)     [] Declines vaccines     Advised regular sunscreen.      Her cardiovascular risks are:   The ASCVD Risk score (Summer MARTINEZ, et al., 2019) failed to calculate for the following reasons:    The 2019 ASCVD risk score is only valid for ages 40 to 79    Risk Evaluation:  1. Diabetes risk factors: obesity, FH of diabetes, and sedentary life style.   2. Cancer risk factors: FH of breast cancer. Mothers side breast cancer   3. Hepatitis  C screen: [x] Due  [] Completed :     Lung Cancer Screen:   [x] Nonsmoker  [] History of smoking  []     [] No Known risk factors    [] Hypertension   [] Hyperlipidemia  [] Diabetes    [x] Obesity  [x] Family history   [] Current or hx tobacco use  [x] Sedentary lifestyle   [] Post-menopausal     Skin Cancer:   Regular Sunsceen: Advised  Routine self assessment of your skin, report any changes.   Always where sunglass when outside with UV protection        I have reviewed patient's medical history, any new submitted information provided by patient or medical assistant and updated medical record.       Objective:      Physical Exam  Vitals reviewed.   Constitutional:       General: She is awake. She is not in acute distress.     Appearance: Normal appearance. She is well-groomed. She is obese. She is not ill-appearing, toxic-appearing or diaphoretic.   HENT:      Head: Normocephalic.      Right Ear: Hearing normal.      Left Ear: Hearing normal.      Nose: Nose normal.      Mouth/Throat:      Lips: Pink.      Mouth: Mucous membranes are moist.   Eyes:      General: Lids are normal. Vision grossly intact.         Right eye: No foreign body, discharge or hordeolum.         Left eye: No foreign body, discharge or hordeolum.      Extraocular Movements: Extraocular movements intact.      Conjunctiva/sclera: Conjunctivae normal.      Pupils: Pupils are equal, round, and reactive to light. Pupils are equal.   Neck:      Thyroid: No thyroid mass, thyromegaly or thyroid tenderness.      Vascular: No carotid bruit or JVD.      Trachea: Trachea and phonation normal.   Cardiovascular:      Rate and Rhythm: Normal rate and regular rhythm.      Pulses: Normal pulses.      Heart sounds: Normal heart sounds, S1 normal and S2 normal. No murmur heard.     No friction rub. No gallop.   Pulmonary:      Effort: Pulmonary effort is normal. No respiratory distress.      Breath sounds: Normal breath sounds. No stridor, decreased air movement or transmitted upper airway sounds. No decreased breath sounds, wheezing, rhonchi or rales.   Abdominal:      General: Abdomen is flat. Bowel sounds are normal. There is no distension.      Palpations: Abdomen is soft. There is no mass.      Tenderness: There is no abdominal tenderness. There is no right CVA tenderness, left CVA tenderness, guarding or rebound.      Hernia: No hernia is present. There is no hernia in the umbilical area.   Musculoskeletal:         General: No swelling, tenderness, deformity or signs of injury. Normal range of motion.      Cervical back: Neck supple. No swelling,  edema, deformity, erythema, signs of trauma, lacerations, rigidity, spasms, torticollis, tenderness, bony tenderness or crepitus. No pain with movement. Decreased range of motion.      Thoracic back: Spasms present. No swelling, edema, deformity, signs of trauma, lacerations, tenderness or bony tenderness. Decreased range of motion. No scoliosis.      Lumbar back: No swelling, edema, deformity, signs of trauma, lacerations, spasms, tenderness or bony tenderness. Decreased range of motion. No scoliosis.      Right lower leg: No edema.      Left lower leg: No edema.   Lymphadenopathy:      Head:      Right side of head: No submental, submandibular, tonsillar or preauricular adenopathy.      Left side of head: No submental, submandibular, tonsillar or preauricular adenopathy.      Cervical: No cervical adenopathy.   Skin:     General: Skin is warm and dry.      Capillary Refill: Capillary refill takes less than 2 seconds.      Coloration: Skin is not jaundiced or pale.      Findings: No bruising, erythema, lesion or rash.   Neurological:      General: No focal deficit present.      Mental Status: She is alert and oriented to person, place, and time. Mental status is at baseline.      Motor: No weakness.      Gait: Gait normal.   Psychiatric:         Attention and Perception: Attention and perception normal.         Mood and Affect: Mood and affect normal.         Speech: Speech normal.         Behavior: Behavior normal. Behavior is cooperative.         Thought Content: Thought content normal.         Cognition and Memory: Cognition and memory normal.         Judgment: Judgment normal.        Result Review  Data Reviewed:{ Labs  Result Review  Imaging  Med Tab  Media :23}     Results         The following data was reviewed by: WYATT Koo on 01/20/2025      Progress Notes by Santa Moreno APRN (01/17/2025 2:20 PM)      Vital Signs:   /72 (BP Location: Left arm, Patient Position: Sitting, Cuff  "Size: Adult)   Pulse 91   Temp 96.9 °F (36.1 °C) (Temporal)   Ht 152.4 cm (60\")   Wt 71.2 kg (157 lb)   SpO2 100%   BMI 30.66 kg/m²                   Requested Prescriptions      No prescriptions requested or ordered in this encounter       Routine medications provided by this office will also be refilled via pharmacy request.       Current Outpatient Medications:      MG tablet, , Disp: , Rfl:     Levonorgestrel (Rhonda) 13.5 MG intrauterine device IUD, To be inserted one time by prescriber. Route intrauterine., Disp: , Rfl:     methocarbamol (ROBAXIN) 500 MG tablet, Take 1 tablet by mouth 2 (Two) Times a Day As Needed for Muscle Spasms., Disp: 60 tablet, Rfl: 0    multivitamin with minerals tablet tablet, Take 1 tablet by mouth Daily., Disp: , Rfl:      Assessment and Plan:      Assessment and Plan {CC Problem List  Visit Diagnosis  ROS  Review (Popup)  Health Maintenance  Quality  BestPractice  Medications  SmartSets  SnapShot Encounters  Media :23}     Problem List Items Addressed This Visit       Lumbar pain (Chronic)    Current Assessment & Plan     Please continue with PT for the next 12 weeks.  If you need refills please request from our office if muscle spasms continue.          Cervical pain (Chronic)    Current Assessment & Plan     Please continue with PT for the next 12 weeks         Annual physical exam - Primary    Current Assessment & Plan     Current recommendations according to the current Physical Activity Guidelines for Americans: adults need 150-300 minutes of physical exercise weekly. It is also recommended to perform two sessions of full body strength training exercise weekly which includes all major muscle groups including legs, hips, back, abdomen, chest, shoulders, and arms.   Current CDC recommendations for diet include following a diet that emphasizes fruits, vegetables, whole grains that is low in saturated fats and low in sugar intake.   Adults should consume at " least 3 cup equivalents of fruit and vegetables daily. It is also beneficial to get 25 grams of fiber daily unless told otherwise by your healthcare provider    Anticipatory guidance given regarding health prevention/wellness, diet/exercise, tobacco/alcohol/drug education, exercise and wellbeing, covid 19 guidance, vaccination recommendations, and sexual health/STD education.   Recommended bi-yearly dental exams and regular vision examinations.           Class 1 obesity due to excess calories without serious comorbidity with body mass index (BMI) of 30.0 to 30.9 in adult (Chronic)    Current Assessment & Plan     Patient's (Body mass index is 30.66 kg/m².) indicates that they are obese (BMI >30) with health conditions that include none . Weight is newly identified. BMI  is above average; BMI management plan is completed. We discussed low calorie, low carb based diet program, portion control, increasing exercise, management of depression/anxiety/stress to control compensatory eating, and Information on healthy weight added to patient's after visit summary.     Per CDC recommendations each week the average adult needs 150 minutes (30 min workouts 3-5 times a week) of moderate - intensity physical activity and 2 days of muscle strengthening.             Other Visit Diagnoses       Routine health maintenance        Relevant Orders    TSH Rfx On Abnormal To Free T4    Comprehensive Metabolic Panel    CBC & Differential    Microalbumin / Creatinine Urine Ratio - Urine, Clean Catch    Need for vaccination        Need for hepatitis C screening test        Relevant Orders    Hepatitis C antibody    Screening for cardiovascular condition        Relevant Orders    Lipid Panel                 1. Health maintenance.  Her last Pap smear was conducted on 07/05/2024, with all previous results being normal. She is sexually active and has never had a mammogram. She reports no changes in bowel habits over the past year, such as  chronic constipation or diarrhea. She underwent a colonoscopy at the age of 4 due to rectal bleeding but has not required one since. She maintains good oral health, visiting the dentist twice a year, with her last visit being 3 months ago. She does not regularly have her eyes checked but uses reading glasses. She walks approximately 3 miles daily at work but does not engage in other physical activities. She is single and works full-time at [employer]. She experiences low stress levels but high anxiety. A comprehensive set of laboratory tests will be conducted today, including CBC, CMP, lipid panel, thyroid function, and urinalysis. She is scheduled to receive her COVID-19 vaccine during this visit. She has been advised to seek an ophthalmological evaluation should she experience symptoms such as headaches or blurred vision. If the lab results return within normal limits, she will be contacted to discuss the findings and schedule a follow-up appointment for repeat testing.    2. Back pain.  She is currently undergoing physical therapy and has 12 more weeks scheduled. She has been using Robaxin for muscle spasms and has about half of her prescription left. She is advised to continue physical therapy as planned. If she experiences muscle spasms post-therapy, she can request a refill of Robaxin.    3. Hand pain.  She saw a hand surgeon and was advised that surgery is not needed at this time. She has 6 more weeks of physical therapy scheduled for her hand. If physical therapy does not alleviate the symptoms, a cortisone shot will be considered, followed by surgery if necessary.    Follow-up  The patient will follow up in 1 year, or sooner if necessary.    PROCEDURE  The patient underwent a colonoscopy at the age of 4 due to rectal bleeding.       Patient Counseling:    Diet:    Eat vegetables, fruits, whole grain, low-fat dairy, poultry, fish, beans, nontropical vegetable oils, and nuts, but avoid red meat (i.e.,  Mediterranean-style diet, DASH [Dietary Approaches to Stop Hypertension] diet).  Limit sugary drinks and sweets.  Limit saturated and trans fat to 5% to 6% of calories.  Limit sodium intake to 2,400 mg daily (about one teaspoon table salt [kosher/sea salt have less sodium per teaspoon]).    Exercise:   Engage in moderate-to-vigorous aerobic activity for at least 40 minutes (on average) three to four times each week.    Weight loss / Calorie Counting Apps:    Lose It!   MyFitness Pal   Works great when you try it with a partner/ friend    Wearables:   Activity tracker   Step tracker     Skin Care:  Practice Safe Sun  Use sun screen SPF >30 daily  Protect your eyes with sunglasses   Dermatologist for skin check regularly    Bone Health:   https://www.nof.org/patients/treatment/nutrition/    Substance Abuse:   Discussed cessation/primary prevention of tobacco, alcohol, or other drug use; driving or other dangerous activities under the influence; availability of treatment for abuse.     Sexuality:   Discussed sexually transmitted diseases, partner selection, use of condoms, avoidance of unintended pregnancy, sexuality  and contraceptive alternatives.     Injury prevention:   Discussed safety belts, safety helmets, smoke detector, smoking near bedding or upholstery.     Dental health:   Discussed importance of regular tooth brushing, flossing, and dental visits.    Colon Cancer screen (discussed benefits of screening colonoscopy)  Should usually start at 45 yoa.     Immunizations reviewed  Recommend Shingles vaccine at age 50 (Shingrix which is 2 doses)     Living Will  https://ag.[place].gov/publications/AG%20Publications/livingwillpacket.pdf    Please complete your lab work today. Following review of results our office will be in contact with you for follow up care, medication changes or further instructions if needed. At that time if we need to schedule a follow up appointment one will be made at the time of the  call.    Thank you for allowing us to care for you,  Cynthia MosleyfélixissaWYATT      Follow Up {Instructions Charge Capture  Follow-up Communications :23}     Return in about 1 year (around 1/20/2026) for Annual physical.      Patient was given instructions and counseling regarding her condition or for health maintenance advice. Please see specific information pulled into the AVS if appropriate.        Dragon disclaimer:   Much of this encounter note is an electronic transcription/translation of spoken language to printed text. The electronic translation of spoken language may permit erroneous, or at times, nonsensical words or phrases to be inadvertently transcribed; Although I have reviewed the note for such errors, some may still exist.     Additional Patient Counseling:       Patient Instructions   Preventive Care 21-39 Years Old, Female  Preventive care refers to lifestyle choices and visits with your health care provider that can promote health and wellness. Preventive care visits are also called wellness exams.  What can I expect for my preventive care visit?  Counseling  During your preventive care visit, your health care provider may ask about your:  Medical history, including:  Past medical problems.  Family medical history.  Pregnancy history.  Current health, including:  Menstrual cycle.  Method of birth control.  Emotional well-being.  Home life and relationship well-being.  Sexual activity and sexual health.  Lifestyle, including:  Alcohol, nicotine or tobacco, and drug use.  Access to firearms.  Diet, exercise, and sleep habits.  Work and work environment.  Sunscreen use.  Safety issues such as seatbelt and bike helmet use.  Physical exam  Your health care provider may check your:  Height and weight. These may be used to calculate your BMI (body mass index). BMI is a measurement that tells if you are at a healthy weight.  Waist circumference. This measures the distance around your waistline. This  measurement also tells if you are at a healthy weight and may help predict your risk of certain diseases, such as type 2 diabetes and high blood pressure.  Heart rate and blood pressure.  Body temperature.  Skin for abnormal spots.  What immunizations do I need?    Vaccines are usually given at various ages, according to a schedule. Your health care provider will recommend vaccines for you based on your age, medical history, and lifestyle or other factors, such as travel or where you work.  What tests do I need?  Screening  Your health care provider may recommend screening tests for certain conditions. This may include:  Pelvic exam and Pap test.  Lipid and cholesterol levels.  Diabetes screening. This is done by checking your blood sugar (glucose) after you have not eaten for a while (fasting).  Hepatitis B test.  Hepatitis C test.  HIV (human immunodeficiency virus) test.  STI (sexually transmitted infection) testing, if you are at risk.  BRCA-related cancer screening. This may be done if you have a family history of breast, ovarian, tubal, or peritoneal cancers.  Talk with your health care provider about your test results, treatment options, and if necessary, the need for more tests.  Follow these instructions at home:  Eating and drinking    Eat a healthy diet that includes fresh fruits and vegetables, whole grains, lean protein, and low-fat dairy products.  Take vitamin and mineral supplements as recommended by your health care provider.  Do not drink alcohol if:  Your health care provider tells you not to drink.  You are pregnant, may be pregnant, or are planning to become pregnant.  If you drink alcohol:  Limit how much you have to 0-1 drink a day.  Know how much alcohol is in your drink. In the [place] one drink equals one 12 oz bottle of beer (355 mL), one 5 oz glass of wine (148 mL), or one 1½ oz glass of hard liquor (44 mL).  Lifestyle  Brush your teeth every morning and night with fluoride toothpaste.  Floss one time each day.  Exercise for at least 30 minutes 5 or more days each week.  Do not use any products that contain nicotine or tobacco. These products include cigarettes, chewing tobacco, and vaping devices, such as e-cigarettes. If you need help quitting, ask your health care provider.  Do not use drugs.  If you are sexually active, practice safe sex. Use a condom or other form of protection to prevent STIs.  If you do not wish to become pregnant, use a form of birth control. If you plan to become pregnant, see your health care provider for a prepregnancy visit.  Find healthy ways to manage stress, such as:  Meditation, yoga, or listening to music.  Journaling.  Talking to a trusted person.  Spending time with friends and family.  Minimize exposure to UV radiation to reduce your risk of skin cancer.  Safety  Always wear your seat belt while driving or riding in a vehicle.  Do not drive:  If you have been drinking alcohol. Do not ride with someone who has been drinking.  If you have been using any mind-altering substances or drugs.  While texting.  When you are tired or distracted.  Wear a helmet and other protective equipment during sports activities.  If you have firearms in your house, make sure you follow all gun safety procedures.  Seek help if you have been physically or sexually abused.  What's next?  Go to your health care provider once a year for an annual wellness visit.  Ask your health care provider how often you should have your eyes and teeth checked.  Stay up to date on all vaccines.  This information is not intended to replace advice given to you by your health care provider. Make sure you discuss any questions you have with your health care provider.  Document Revised: 06/15/2022 Document Reviewed: 06/15/2022  Elsevier Patient Education © 2024 [employer]

## 2025-01-21 LAB
ALBUMIN SERPL-MCNC: 4.4 G/DL (ref 4–5)
ALBUMIN/CREAT UR: 9 MG/G CREAT (ref 0–29)
ALP SERPL-CCNC: 80 IU/L (ref 44–121)
ALT SERPL-CCNC: 7 IU/L (ref 0–32)
AST SERPL-CCNC: 14 IU/L (ref 0–40)
BASOPHILS # BLD AUTO: 0 X10E3/UL (ref 0–0.2)
BASOPHILS NFR BLD AUTO: 1 %
BILIRUB SERPL-MCNC: 0.6 MG/DL (ref 0–1.2)
BUN SERPL-MCNC: 12 MG/DL (ref 6–20)
BUN/CREAT SERPL: 20 (ref 9–23)
CALCIUM SERPL-MCNC: 9.4 MG/DL (ref 8.7–10.2)
CHLORIDE SERPL-SCNC: 103 MMOL/L (ref 96–106)
CHOLEST SERPL-MCNC: 166 MG/DL (ref 100–199)
CO2 SERPL-SCNC: 23 MMOL/L (ref 20–29)
CREAT SERPL-MCNC: 0.59 MG/DL (ref 0.57–1)
CREAT UR-MCNC: 233.4 MG/DL
EGFRCR SERPLBLD CKD-EPI 2021: 130 ML/MIN/1.73
EOSINOPHIL # BLD AUTO: 0.2 X10E3/UL (ref 0–0.4)
EOSINOPHIL NFR BLD AUTO: 3 %
ERYTHROCYTE [DISTWIDTH] IN BLOOD BY AUTOMATED COUNT: 12.9 % (ref 11.7–15.4)
GLOBULIN SER CALC-MCNC: 2.8 G/DL (ref 1.5–4.5)
GLUCOSE SERPL-MCNC: 85 MG/DL (ref 70–99)
HCT VFR BLD AUTO: 46.9 % (ref 34–46.6)
HCV IGG SERPL QL IA: NON REACTIVE
HDLC SERPL-MCNC: 52 MG/DL
HGB BLD-MCNC: 15.6 G/DL (ref 11.1–15.9)
IMM GRANULOCYTES # BLD AUTO: 0 X10E3/UL (ref 0–0.1)
IMM GRANULOCYTES NFR BLD AUTO: 0 %
LDLC SERPL CALC-MCNC: 102 MG/DL (ref 0–99)
LYMPHOCYTES # BLD AUTO: 2.1 X10E3/UL (ref 0.7–3.1)
LYMPHOCYTES NFR BLD AUTO: 32 %
MCH RBC QN AUTO: 28.5 PG (ref 26.6–33)
MCHC RBC AUTO-ENTMCNC: 33.3 G/DL (ref 31.5–35.7)
MCV RBC AUTO: 86 FL (ref 79–97)
MICROALBUMIN UR-MCNC: 19.9 UG/ML
MONOCYTES # BLD AUTO: 0.5 X10E3/UL (ref 0.1–0.9)
MONOCYTES NFR BLD AUTO: 7 %
NEUTROPHILS # BLD AUTO: 3.8 X10E3/UL (ref 1.4–7)
NEUTROPHILS NFR BLD AUTO: 57 %
PLATELET # BLD AUTO: 325 X10E3/UL (ref 150–450)
POTASSIUM SERPL-SCNC: 4.3 MMOL/L (ref 3.5–5.2)
PROT SERPL-MCNC: 7.2 G/DL (ref 6–8.5)
RBC # BLD AUTO: 5.47 X10E6/UL (ref 3.77–5.28)
SODIUM SERPL-SCNC: 139 MMOL/L (ref 134–144)
T4 FREE SERPL-MCNC: 1.2 NG/DL (ref 0.82–1.77)
TRIGL SERPL-MCNC: 60 MG/DL (ref 0–149)
TSH SERPL DL<=0.005 MIU/L-ACNC: 0.39 UIU/ML (ref 0.45–4.5)
VLDLC SERPL CALC-MCNC: 12 MG/DL (ref 5–40)
WBC # BLD AUTO: 6.6 X10E3/UL (ref 3.4–10.8)

## 2025-01-23 DIAGNOSIS — R79.89 ABNORMAL SERUM THYROID STIMULATING HORMONE (TSH) LEVEL: Primary | ICD-10-CM
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
## 2025-02-10 ENCOUNTER — TREATMENT (OUTPATIENT)
Dept: PHYSICAL THERAPY | Facility: CLINIC | Age: 25
End: 2025-02-10
Payer: COMMERCIAL

## 2025-02-10 DIAGNOSIS — M54.2 CERVICAL PAIN: ICD-10-CM

## 2025-02-10 DIAGNOSIS — S13.4XXA WHIPLASH INJURIES, INITIAL ENCOUNTER: Primary | ICD-10-CM

## 2025-02-10 DIAGNOSIS — M54.6 THORACIC SPINE PAIN: ICD-10-CM

## 2025-02-10 PROCEDURE — 97161 PT EVAL LOW COMPLEX 20 MIN: CPT | Performed by: PHYSICAL THERAPIST

## 2025-02-10 PROCEDURE — 97110 THERAPEUTIC EXERCISES: CPT | Performed by: PHYSICAL THERAPIST
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
## 2025-07-29 ENCOUNTER — DOCUMENTATION (OUTPATIENT)
Dept: PHYSICAL THERAPY | Facility: CLINIC | Age: 25
End: 2025-07-29
Payer: COMMERCIAL

## 2025-07-29 NOTE — PROGRESS NOTES
Pt cancelled last appt, did not call back to christine.  See last progress note for objective measurements/progress towards goals.